# Patient Record
Sex: MALE | Race: WHITE | NOT HISPANIC OR LATINO | Employment: UNEMPLOYED | URBAN - NONMETROPOLITAN AREA
[De-identification: names, ages, dates, MRNs, and addresses within clinical notes are randomized per-mention and may not be internally consistent; named-entity substitution may affect disease eponyms.]

---

## 2022-04-25 ENCOUNTER — HOSPITAL ENCOUNTER (EMERGENCY)
Facility: HOSPITAL | Age: 22
Discharge: HOME | End: 2022-04-25
Attending: PHYSICIAN ASSISTANT | Admitting: PHYSICIAN ASSISTANT

## 2022-04-25 VITALS
OXYGEN SATURATION: 97 % | TEMPERATURE: 98.5 F | RESPIRATION RATE: 20 BRPM | SYSTOLIC BLOOD PRESSURE: 139 MMHG | DIASTOLIC BLOOD PRESSURE: 88 MMHG | HEART RATE: 89 BPM

## 2022-04-25 DIAGNOSIS — J06.9 URI (UPPER RESPIRATORY INFECTION): ICD-10-CM

## 2022-04-25 LAB
FLUAV RNA SPEC QL NAA+PROBE: NEGATIVE
FLUBV RNA RESP QL NAA+PROBE: NEGATIVE
GROUP A STREP BY PCR: NOT DETECTED
RSV RNA SPEC NAA+PROBE: NEGATIVE
SARS-COV-2 RNA RESP QL NAA+PROBE: NEGATIVE

## 2022-04-25 PROCEDURE — C9803 HOPD COVID-19 SPEC COLLECT: HCPCS

## 2022-04-25 PROCEDURE — G0463 HOSPITAL OUTPT CLINIC VISIT: HCPCS

## 2022-04-25 PROCEDURE — 99213 OFFICE O/P EST LOW 20 MIN: CPT | Performed by: PHYSICIAN ASSISTANT

## 2022-04-25 PROCEDURE — 87637 SARSCOV2&INF A&B&RSV AMP PRB: CPT | Performed by: PHYSICIAN ASSISTANT

## 2022-04-25 PROCEDURE — 87651 STREP A DNA AMP PROBE: CPT | Performed by: PHYSICIAN ASSISTANT

## 2022-04-25 ASSESSMENT — ENCOUNTER SYMPTOMS
FEVER: 0
COUGH: 1
SORE THROAT: 1

## 2022-04-25 NOTE — ED TRIAGE NOTES
Cold s/s: cough, sore throat. X 4 days. Has been missing work, he has to be seen per his work requesting,  denies fever/chills.

## 2022-04-25 NOTE — ED PROVIDER NOTES
History     Chief Complaint   Patient presents with     Cold Symptoms     X4 days     HPI  Kurt Monreal is a 21 year old male who presents to urgent care for evaluation of cold symptoms.  Over the past 4 days patient has had cough, congestion, sore throat.  Patient states that he was required to come in to be evaluated because of work.  Patient denies any shortness of breath, nausea, vomiting, diarrhea, myalgias, or any other associated symptoms.    Allergies:  No Known Allergies    Problem List:    There are no problems to display for this patient.       Past Medical History:    No past medical history on file.    Past Surgical History:    No past surgical history on file.    Family History:    No family history on file.    Social History:  Marital Status:  Single [1]        Medications:    No current outpatient medications on file.        Review of Systems   Constitutional: Negative for fever.   HENT: Positive for congestion and sore throat.    Respiratory: Positive for cough.    All other systems reviewed and are negative.      Physical Exam   BP: 139/88  Pulse: 89  Temp: 98.5  F (36.9  C)  Resp: 20  SpO2: 97 %      Physical Exam  Vitals and nursing note reviewed.   Constitutional:       Appearance: Normal appearance. He is not ill-appearing.   HENT:      Right Ear: Tympanic membrane normal.      Left Ear: Tympanic membrane normal.      Nose: No congestion or rhinorrhea.      Mouth/Throat:      Mouth: Mucous membranes are moist.      Pharynx: No oropharyngeal exudate or posterior oropharyngeal erythema.   Eyes:      Conjunctiva/sclera: Conjunctivae normal.      Pupils: Pupils are equal, round, and reactive to light.   Cardiovascular:      Rate and Rhythm: Regular rhythm.      Heart sounds: Normal heart sounds.   Pulmonary:      Effort: Pulmonary effort is normal.      Breath sounds: Normal breath sounds.   Neurological:      Mental Status: He is oriented to person, place, and time.         ED Course                  Procedures             Critical Care time:               No results found for this or any previous visit (from the past 24 hour(s)).    Medications - No data to display    Assessments & Plan (with Medical Decision Making)   #1.  URI    Discussed exam findings with patient.  Patient has strep test and COVID test pending will be notified of abnormal results patient is instructed appropriate social distancing and supportive cares in the meantime.  Push fluids and rest.  Any significant shortness of breath go to emergency department immediately.  Any additional concerns please return to urgent care or follow-up with primary care provider.  Patient verbalized understanding and agreement of plan.  Patient was given a work note excusing him.    I have reviewed the nursing notes.    I have reviewed the findings, diagnosis, plan and need for follow up with the patient.    New Prescriptions    No medications on file       Final diagnoses:   URI (upper respiratory infection)       4/25/2022   [facility]     Kishan Schaefer PA-C  04/25/22 1978

## 2022-04-25 NOTE — Clinical Note
Kurt Monreal was seen and treated in our emergency department on 4/25/2022.  He may return to work on 04/27/2022.  Please excuse patient from work due to medical illness.  Patient should not return until his symptoms improve.     If you have any questions or concerns, please don't hesitate to call.      Kishan Schaefer PA-C

## 2022-04-25 NOTE — ED TRIAGE NOTES
Pt presents to urgent care with c/o cough, stuffy nose and sore throat that has been ongoing for 4 days no s/s of fever. Pt has not taken any meds for it.

## 2022-04-27 ENCOUNTER — TELEPHONE (OUTPATIENT)
Dept: EMERGENCY MEDICINE | Facility: HOSPITAL | Age: 22
End: 2022-04-27

## 2023-01-28 ENCOUNTER — HOSPITAL ENCOUNTER (EMERGENCY)
Facility: HOSPITAL | Age: 23
Discharge: HOME | End: 2023-01-28
Attending: PHYSICIAN ASSISTANT | Admitting: PHYSICIAN ASSISTANT

## 2023-01-28 VITALS
DIASTOLIC BLOOD PRESSURE: 109 MMHG | SYSTOLIC BLOOD PRESSURE: 162 MMHG | HEART RATE: 112 BPM | RESPIRATION RATE: 18 BRPM | TEMPERATURE: 98.7 F | OXYGEN SATURATION: 94 %

## 2023-01-28 DIAGNOSIS — H73.91 TYMPANIC MEMBRANE IRRITATION, RIGHT: ICD-10-CM

## 2023-01-28 DIAGNOSIS — H92.01 OTALGIA, RIGHT: ICD-10-CM

## 2023-01-28 DIAGNOSIS — H66.001 ACUTE SUPPURATIVE OTITIS MEDIA OF RIGHT EAR WITHOUT SPONTANEOUS RUPTURE OF TYMPANIC MEMBRANE, RECURRENCE NOT SPECIFIED: ICD-10-CM

## 2023-01-28 DIAGNOSIS — H66.001 ACUTE SUPPURATIVE OTITIS MEDIA OF RIGHT EAR WITHOUT SPONTANEOUS RUPTURE OF TYMPANIC MEMBRANE: ICD-10-CM

## 2023-01-28 PROCEDURE — 96372 THER/PROPH/DIAG INJ SC/IM: CPT | Performed by: PHYSICIAN ASSISTANT

## 2023-01-28 PROCEDURE — 99213 OFFICE O/P EST LOW 20 MIN: CPT | Performed by: PHYSICIAN ASSISTANT

## 2023-01-28 PROCEDURE — 250N000011 HC RX IP 250 OP 636: Performed by: PHYSICIAN ASSISTANT

## 2023-01-28 PROCEDURE — G0463 HOSPITAL OUTPT CLINIC VISIT: HCPCS | Mod: 25

## 2023-01-28 RX ORDER — CEFTRIAXONE SODIUM 1 G
1 VIAL (EA) INJECTION ONCE
Status: COMPLETED | OUTPATIENT
Start: 2023-01-28 | End: 2023-01-28

## 2023-01-28 RX ORDER — AMOXICILLIN 875 MG
875 TABLET ORAL 2 TIMES DAILY
Qty: 20 TABLET | Refills: 0 | Status: SHIPPED | OUTPATIENT
Start: 2023-01-28 | End: 2023-02-07

## 2023-01-28 RX ADMIN — CEFTRIAXONE 1 G: 1 INJECTION, POWDER, FOR SOLUTION INTRAMUSCULAR; INTRAVENOUS at 12:54

## 2023-01-28 ASSESSMENT — ENCOUNTER SYMPTOMS
TREMORS: 0
SEIZURES: 0
FACIAL ASYMMETRY: 0
VOMITING: 0
EYE REDNESS: 0
COUGH: 0
FEVER: 0
CONFUSION: 0
FACIAL SWELLING: 0

## 2023-01-28 NOTE — ED TRIAGE NOTES
Pt presents with c/o right ear fullness/ pain  Started the other day.  Full, pain full and itching, sounds like water in his ear, painful to open mouth, swollen feeling   Denies any drainage.    Tried ibu for the pain

## 2023-01-28 NOTE — ED TRIAGE NOTES
"Patient presents to triage with c/o right ear pain. Pain and itching started 1.5 days ago. C/o fullness feeling and when he moves his head can hear \"slushing and swishing\" noises. Hurts his ear to open his mouth. Denies any ear drainage.       "

## 2023-01-28 NOTE — ED PROVIDER NOTES
"  History     Chief Complaint   Patient presents with     Otalgia     HPI  Kurt Monreal is a 22 year old male who is noted some significant right ear fullness and pain that seem to start the other day.  He states it feels like there is water in his ear.  He can feel a \"sloshing and swishing\" noise.  He has some pain with opening his mouth.  Denies any ear discharge.  His ear has seemed to be more itchy and full over the past day and a half.  No fever or chills.  No other issues at this time.    Allergies:  No Known Allergies    Problem List:    There are no problems to display for this patient.       Past Medical History:    No past medical history on file.    Past Surgical History:    No past surgical history on file.    Family History:    No family history on file.    Social History:  Marital Status:  Single [1]        Medications:    amoxicillin (AMOXIL) 875 MG tablet          Review of Systems   Constitutional: Negative for fever.   HENT: Positive for ear pain. Negative for drooling, ear discharge and facial swelling.         Right ear fullness and pain, no discharge.   Eyes: Negative for redness.   Respiratory: Negative for cough.    Gastrointestinal: Negative for vomiting.   Skin: Negative for pallor.   Neurological: Negative for tremors, seizures and facial asymmetry.   Psychiatric/Behavioral: Negative for confusion.   All other systems reviewed and are negative.      Physical Exam   BP: (!) 162/109  Pulse: 112  Temp: 98.7  F (37.1  C)  Resp: 18  SpO2: 94 %      Physical Exam  Vitals and nursing note reviewed.   Constitutional:       General: He is not in acute distress.     Appearance: Normal appearance. He is not ill-appearing or toxic-appearing.   HENT:      Head: Normocephalic.      Right Ear: Swelling and tenderness present. No drainage. A middle ear effusion is present. Tympanic membrane is injected, erythematous and bulging.      Left Ear: No drainage or tenderness. Tympanic membrane is not " "injected, erythematous or bulging.      Nose: Nose normal.   Eyes:      General: No scleral icterus.     Extraocular Movements: Extraocular movements intact.   Neck:      Trachea: No tracheal deviation.   Cardiovascular:      Rate and Rhythm: Normal rate.   Pulmonary:      Effort: Pulmonary effort is normal. No respiratory distress.   Musculoskeletal:         General: Normal range of motion.      Cervical back: Normal range of motion.   Skin:     General: Skin is warm and dry.      Coloration: Skin is not jaundiced or pale.   Neurological:      General: No focal deficit present.      Mental Status: He is alert and oriented to person, place, and time.   Psychiatric:         Attention and Perception: Attention normal.         Mood and Affect: Mood normal.         ED Course        No results found for this or any previous visit (from the past 24 hour(s)).    Medications   cefTRIAXone (ROCEPHIN) in lidocaine 1% (PF) for IM administration 1 g (1 g Intramuscular Given 1/28/23 1254)       Assessments & Plan (with Medical Decision Making)     I have reviewed the nursing notes.    I have reviewed the findings, diagnosis, plan and need for follow up with the patient.        Discharge Medication List as of 1/28/2023 12:57 PM      START taking these medications    Details   amoxicillin (AMOXIL) 875 MG tablet Take 1 tablet (875 mg) by mouth 2 times daily for 10 days, Disp-20 tablet, R-0, Local Print             Final diagnoses:   Acute suppurative otitis media of right ear without spontaneous rupture of tympanic membrane   Tympanic membrane irritation, right   Otalgia, right     Kurt Monreal is a 22 year old male who is noted some significant right ear fullness and pain that seem to start the other day.  He states it feels like there is water in his ear.  He can feel a \"sloshing and swishing\" noise.  He has some pain with opening his mouth.  Denies any ear discharge.  His ear has seemed to be more itchy and full over the " "past day and a half.  No fever or chills.  No other issues at this time.  Afebrile.  Vital signs stable.  Physical exam shows he is afebrile.  Examining his right ear he has significant bulging to the TM with erythema and it appears injected.  Tenderness throughout the exam.  I do not appreciate any perforation at this time.  I discussed various options with the patient.  He reports he does not have insurance so he would like an antibiotic that is \"cheap\".  He was given Rocephin IM in the ER.  Rx for amoxicillin x10 days as well.  He declines need for work note.  Continue to monitor symptoms return if there is any concerns for further evaluation as needed.  Referral to ENT.  Follow-up with Dr. Billingsley if his symptoms persist for ENT evaluation.  1/28/2023   [facility]                  Claus Rodriguez PA-C  01/28/23 1319    "

## 2025-02-28 ENCOUNTER — HOSPITAL ENCOUNTER (EMERGENCY)
Facility: HOSPITAL | Age: 25
Discharge: HOME | End: 2025-02-28
Attending: NURSE PRACTITIONER | Admitting: NURSE PRACTITIONER

## 2025-02-28 VITALS
SYSTOLIC BLOOD PRESSURE: 141 MMHG | TEMPERATURE: 99.8 F | BODY MASS INDEX: 37.89 KG/M2 | HEIGHT: 68 IN | DIASTOLIC BLOOD PRESSURE: 91 MMHG | HEART RATE: 109 BPM | WEIGHT: 250 LBS | OXYGEN SATURATION: 96 % | RESPIRATION RATE: 20 BRPM

## 2025-02-28 DIAGNOSIS — R68.89 FLU-LIKE SYMPTOMS: Primary | ICD-10-CM

## 2025-02-28 DIAGNOSIS — Z20.828 EXPOSURE TO INFLUENZA: ICD-10-CM

## 2025-02-28 PROCEDURE — 99213 OFFICE O/P EST LOW 20 MIN: CPT | Performed by: NURSE PRACTITIONER

## 2025-02-28 PROCEDURE — G0463 HOSPITAL OUTPT CLINIC VISIT: HCPCS

## 2025-02-28 RX ORDER — ONDANSETRON 4 MG/1
4 TABLET, ORALLY DISINTEGRATING ORAL EVERY 8 HOURS PRN
Qty: 10 TABLET | Refills: 0 | Status: SHIPPED | OUTPATIENT
Start: 2025-02-28 | End: 2025-03-03

## 2025-02-28 ASSESSMENT — ENCOUNTER SYMPTOMS
VOMITING: 1
CHILLS: 1
SORE THROAT: 1
FEVER: 0
COUGH: 1
ABDOMINAL PAIN: 0
DIARRHEA: 0

## 2025-02-28 ASSESSMENT — ACTIVITIES OF DAILY LIVING (ADL): ADLS_ACUITY_SCORE: 41

## 2025-02-28 NOTE — Clinical Note
Kurt Monreal was seen and treated in our emergency department on 2/28/2025.  He may return to work on 03/03/2025.       If you have any questions or concerns, please don't hesitate to call.      Mpofu, Prudence, CNP

## 2025-02-28 NOTE — ED PROVIDER NOTES
"  History     Chief Complaint   Patient presents with    Flu Symptoms     HPI  Kurt Monreal is a 24 year old male who presents to urgent care requesting a work excuse note.  Patient reports a cough, chills, sore throat and vomiting.  He is currently on day 5 of his symptoms.  Reports 1 episode of emesis a day.  No chest pain.  No known fevers at home.  Currently notes that he feels short of breath which he attributes to having walked over to this facility kind of fast.  He is able to keep some fluids down.  No abdominal pain or diarrhea.  Reports he was around his brother who recently tested positive for influenza A.  He has called in sick to work and they are requiring him to have a work excuse note.  Declines any respiratory viral testing today.  Taking 1000 mg of acetaminophen and notes last dose was this morning.    Allergies:  No Known Allergies    Problem List:    There are no active problems to display for this patient.       Past Medical History:    History reviewed. No pertinent past medical history.    Past Surgical History:    History reviewed. No pertinent surgical history.    Family History:    History reviewed. No pertinent family history.    Social History:  Marital Status:  Single [1]        Medications:    ondansetron (ZOFRAN ODT) 4 MG ODT tab          Review of Systems   Constitutional:  Positive for chills. Negative for fever.   HENT:  Positive for congestion and sore throat.    Respiratory:  Positive for cough.    Cardiovascular:  Negative for chest pain.   Gastrointestinal:  Positive for vomiting. Negative for abdominal pain and diarrhea.   All other systems reviewed and are negative.      Physical Exam   BP: (!) 141/91  Pulse: 109  Temp: 99.8  F (37.7  C)  Resp: 20  Height: 172.7 cm (5' 8\")  Weight: 113.4 kg (250 lb)  SpO2: 96 %      Physical Exam  Vitals and nursing note reviewed.   Constitutional:       General: He is not in acute distress.     Appearance: He is well-developed. He is " not diaphoretic.   HENT:      Head: Normocephalic and atraumatic.      Right Ear: Tympanic membrane and ear canal normal.      Left Ear: Tympanic membrane and ear canal normal.      Nose: Nose normal.      Mouth/Throat:      Mouth: Mucous membranes are moist.   Eyes:      Pupils: Pupils are equal, round, and reactive to light.   Cardiovascular:      Rate and Rhythm: Normal rate and regular rhythm.      Heart sounds: Normal heart sounds.   Pulmonary:      Effort: Pulmonary effort is normal. No accessory muscle usage or respiratory distress.      Breath sounds: Normal breath sounds. No decreased breath sounds, wheezing or rhonchi.      Comments: No tachypnea.  Talking in full sentences with minimal difficulty.  Respirations even and nonlabored.  Oxygen saturation 96% on room air.  Abdominal:      General: Bowel sounds are normal.      Tenderness: There is no abdominal tenderness. There is no guarding or rebound.      Hernia: No hernia is present.   Musculoskeletal:      Cervical back: Normal range of motion and neck supple.   Skin:     General: Skin is warm and dry.      Coloration: Skin is not pale.   Neurological:      Mental Status: He is alert and oriented to person, place, and time.         ED Course        Procedures        No results found for this or any previous visit (from the past 24 hours).    Medications - No data to display    Assessments & Plan (with Medical Decision Making)   Very pleasant 24-year-old male with flulike symptoms that started 5 days ago.  Known recent exposure to influenza A.  Respirations even and nonlabored.  He had a soft and nontender abdomen on palpation.  No erythema to posterior pharynx.  Uvula was midline.  Vital signs within normal limits.    Encouraged continued symptomatic care for influenza-like illness with patient.  Patient concerned about going back to work with the vomiting episodes although he notes he is only having 1 episode of emesis a day.  Zofran as prescribed.   Work excuse note given for the weekend.  Patient to return to work on Monday.  Follow-up with primary doctor as needed.  Return to urgent care or emergency department for any worsening or concerning symptoms.      I have reviewed the nursing notes.    I have reviewed the findings, diagnosis, plan and need for follow up with the patient.  This document was prepared using a combination of typing and voice generated software.  While every attempt was made for accuracy, spelling and grammatical errors may exist.         Discharge Medication List as of 2/28/2025 10:23 AM        START taking these medications    Details   ondansetron (ZOFRAN ODT) 4 MG ODT tab Take 1 tablet (4 mg) by mouth every 8 hours as needed for nausea or vomiting., Disp-10 tablet, R-0, Local Print             Final diagnoses:   Flu-like symptoms   Exposure to influenza       2/28/2025   [facility]       Mpofu, Prudence, CNP  02/28/25 1033       Mpofu, Prudence, CNP  02/28/25 1034

## 2025-02-28 NOTE — ED TRIAGE NOTES
Pt presents with concerns of sore throat, congestion, cough and vomiting. Symptom onset was Monday. Needs note for work.       Exposed to influenza A declined COVID RSV and influenza testing

## 2025-02-28 NOTE — DISCHARGE INSTRUCTIONS
Continue with acetaminophen as you have been doing.  Drink fluids to stay hydrated.  Take the Zofran as needed for nausea/vomiting.    Follow-up with your doctor as needed.  Return to urgent care or emergency department for any worsening or concerning symptoms.